# Patient Record
Sex: MALE | Race: WHITE | Employment: OTHER | URBAN - METROPOLITAN AREA
[De-identification: names, ages, dates, MRNs, and addresses within clinical notes are randomized per-mention and may not be internally consistent; named-entity substitution may affect disease eponyms.]

---

## 2020-09-25 ENCOUNTER — OFFICE VISIT (OUTPATIENT)
Dept: FAMILY MEDICINE CLINIC | Age: 55
End: 2020-09-25
Payer: COMMERCIAL

## 2020-09-25 VITALS
DIASTOLIC BLOOD PRESSURE: 72 MMHG | HEART RATE: 64 BPM | BODY MASS INDEX: 24.38 KG/M2 | RESPIRATION RATE: 18 BRPM | SYSTOLIC BLOOD PRESSURE: 118 MMHG | WEIGHT: 190 LBS | OXYGEN SATURATION: 99 % | HEIGHT: 74 IN

## 2020-09-25 DIAGNOSIS — H93.13 TINNITUS OF BOTH EARS: Primary | ICD-10-CM

## 2020-09-25 PROCEDURE — 99212 OFFICE O/P EST SF 10 MIN: CPT | Performed by: NURSE PRACTITIONER

## 2020-09-25 RX ORDER — CHLORHEXIDINE GLUCONATE 1.2 MG/ML
15 RINSE ORAL EVERY 12 HOURS
COMMUNITY
End: 2020-12-17

## 2020-09-25 RX ORDER — VALACYCLOVIR HYDROCHLORIDE 500 MG/1
TABLET, FILM COATED ORAL 2 TIMES DAILY
COMMUNITY
End: 2021-02-22 | Stop reason: SDUPTHER

## 2020-10-01 PROBLEM — H93.13 TINNITUS OF BOTH EARS: Status: ACTIVE | Noted: 2020-10-01

## 2020-10-02 NOTE — PATIENT INSTRUCTIONS
We will fax the referral to the ENT provider and can make an appt at your convenience. Consider signing up for MyChart for easier communication w/ office.

## 2020-10-02 NOTE — PROGRESS NOTES
Chief Complaint   Patient presents with   [facility] in Ear     would like to get a referral to another specialist     Visit Vitals  /72 (BP 1 Location: Right arm, BP Patient Position: Sitting)   Pulse 64   Resp 18   Ht 6' 2\" (1.88 m)   Wt 190 lb (86.2 kg)   SpO2 99%   BMI 24.39 kg/m²     Yocasta Arrington is a 60 y.o. male. HPI   Pt usually seen by Dr. Nadir Syed. Presented today w/ request for a referral to another ENT provider for a second opinion regarding dx of tinnitus. He has a provider in mind. Was referred to audiology for an audiogram by first ENT and apparently was ok. He also had a lot of cerumen in both ears that was removed. Pt thinks there might be more options for him, thus request for second opinion. Pt is feeling well today. He tries to eat well and gets a lot of exercise- V/S good and his weight is in normal range. No other concerns. Has dx of tinnitus. Patient Active Problem List   Diagnosis Code    Tinnitus of both ears H93.13     History reviewed. No pertinent past medical history. Current Outpatient Medications   Medication Instructions    chlorhexidine (PERIDEX) 0.12 % solution 15 mL, Swish and Spit, EVERY 12 HOURS    valACYclovir (Valtrex) 500 mg tablet Oral, 2 TIMES DAILY     No Known Allergies  Social History     Tobacco Use    Smoking status: Never Smoker    Smokeless tobacco: Never Used   Substance Use Topics    Alcohol use: Never     Frequency: Never    Drug use: Never     Family History   Problem Relation Age of Onset    Hypertension Mother     Heart Disease Father      Review of Systems   Constitutional: Negative for chills and fever. HENT: Positive for tinnitus. Negative for ear discharge, ear pain, hearing loss, sinus pain and sore throat. Eyes: Negative for blurred vision. Respiratory: Negative for cough, shortness of breath and wheezing. Cardiovascular: Negative for chest pain and palpitations.    Gastrointestinal: Negative for nausea and vomiting. Musculoskeletal: Negative for joint pain and myalgias. Skin: Negative for rash. Neurological: Negative for dizziness and headaches. Psychiatric/Behavioral: Negative for depression. The patient is not nervous/anxious. Objective  Physical Exam  Constitutional:       Appearance: Normal appearance. HENT:      Head: Normocephalic. Right Ear: Tympanic membrane, ear canal and external ear normal.      Left Ear: Tympanic membrane, ear canal and external ear normal.      Nose: Nose normal.   Eyes:      Conjunctiva/sclera: Conjunctivae normal.   Neck:      Musculoskeletal: Neck supple. Cardiovascular:      Pulses: Normal pulses. Heart sounds: Normal heart sounds. Pulmonary:      Effort: Pulmonary effort is normal.      Breath sounds: Normal breath sounds. Musculoskeletal: Normal range of motion. Right lower leg: No edema. Left lower leg: No edema. Skin:     General: Skin is warm and dry. Coloration: Skin is not jaundiced. Neurological:      Mental Status: He is alert and oriented to person, place, and time. Psychiatric:         Mood and Affect: Mood normal.         Behavior: Behavior normal.         Thought Content: Thought content normal.        Assessment & Plan    ICD-10-CM ICD-9-CM    1.  Tinnitus of both ears  H93.13 388.30 REFERRAL TO ENT-OTOLARYNGOLOGY     Christina Caballero NP

## 2020-11-12 ENCOUNTER — TELEPHONE (OUTPATIENT)
Dept: FAMILY MEDICINE CLINIC | Age: 55
End: 2020-11-12

## 2020-11-12 NOTE — TELEPHONE ENCOUNTER
Sommer left a voicemail that  he had cut himself with sheet metal and wanted to know when the last TDAP injection was given. Returned patient's call. X 2 and both went to voicemail. Voicemail left with the date of 04/19/2015. Suggested new dose at pharmacy or to call the office since it was 5 years ago. I told him that they are good for 5-10 years but should be given sooner if they receive a cut.

## 2020-12-17 ENCOUNTER — OFFICE VISIT (OUTPATIENT)
Dept: FAMILY MEDICINE CLINIC | Age: 55
End: 2020-12-17
Payer: COMMERCIAL

## 2020-12-17 VITALS
WEIGHT: 191 LBS | OXYGEN SATURATION: 98 % | SYSTOLIC BLOOD PRESSURE: 110 MMHG | HEART RATE: 63 BPM | DIASTOLIC BLOOD PRESSURE: 74 MMHG | TEMPERATURE: 98.4 F | RESPIRATION RATE: 16 BRPM | HEIGHT: 74 IN | BODY MASS INDEX: 24.51 KG/M2

## 2020-12-17 DIAGNOSIS — I86.1 VARICOCELE: ICD-10-CM

## 2020-12-17 DIAGNOSIS — R19.5 LOOSE STOOLS: ICD-10-CM

## 2020-12-17 DIAGNOSIS — R30.0 DYSURIA: ICD-10-CM

## 2020-12-17 PROCEDURE — 99214 OFFICE O/P EST MOD 30 MIN: CPT | Performed by: FAMILY MEDICINE

## 2020-12-17 NOTE — ASSESSMENT & PLAN NOTE
Intermittent x 1 week. UA normal at urgent care by report. No discharge. Not sexually active, resolving. Will monitor.

## 2020-12-17 NOTE — PROGRESS NOTES
Patient: Heaven Gramajo               Sex: male             MRN: [id number]     YOB: 1965      Age:  60 y.o. Subjective:     Heaven Gramajo is a 60 y.o. male presents c/o scrotal discomfort and loose stools. Also notes dysuria recently that is resolving. For 1-2 weeks notes bilateral scrotal discomfort. Described as a heavy feeling with aching at times. No constant. Worse after being on feet for most of the day. Of note he's been working out more to include heavy lifting. No masses appreciated. No h/o scrotal/testicalar issues. No discharge. No fevers. No treatment to date. He also notes some loose stools, intermittent. No blood. No abdominal pain. Dysuria seen at urgent care, improving. History reviewed. No pertinent past medical history. Past Surgical History:   Procedure Laterality Date    HX COLONOSCOPY  2018 2018 repeat in 5 years     HX ORTHOPAEDIC      left ankle surgery        Family History   Problem Relation Age of Onset    Hypertension Mother     Heart Disease Father     Hearing Impairment Father     Kidney Disease Father        Social History     Socioeconomic History    Marital status:      Spouse name: Not on file    Number of children: Not on file    Years of education: Not on file    Highest education level: Not on file   Tobacco Use    Smoking status: Never Smoker    Smokeless tobacco: Former User     Types: Snuff   Substance and Sexual Activity    Alcohol use: Not Currently     Frequency: Never    Drug use: Never       Prior to Admission medications    Medication Sig Start Date End Date Taking? Authorizing Provider   valACYclovir (Valtrex) 500 mg tablet Take  by mouth two (2) times a day. Provider, Historical       No Known Allergies      Review of Systems  Review of Systems   Constitutional: Negative for chills and fever. HENT: Negative. Eyes: Negative for blurred vision.    Respiratory: Negative for cough and shortness of breath. Cardiovascular: Negative for chest pain and palpitations. Gastrointestinal: Negative for abdominal pain, blood in stool, constipation, melena, nausea and vomiting. Genitourinary: Positive for dysuria. Negative for flank pain, frequency, hematuria and urgency. Musculoskeletal: Negative for joint pain. Skin: Negative for rash. Neurological: Negative for sensory change and focal weakness. Objective:     Vitals:    12/17/20 0840 12/17/20 0845   BP: 132/76 110/74   Pulse: 63    Resp: 16    Temp: 98.4 °F (36.9 °C)    TempSrc: Oral    SpO2: 98%    Weight: 191 lb (86.6 kg)    Height: 6' 2\" (1.88 m)          Physical Exam:  Physical Exam  Vitals signs reviewed. Constitutional:       Appearance: Normal appearance. Neurological:      Mental Status: He is alert. Psychiatric:         Mood and Affect: Mood normal.          Assessment/Plan     Diagnoses and all orders for this visit:    1. Varicocele  Assessment & Plan:  Scrotal discomfort with \"heavy feeling\". Most c/w varicocele. Advised nsaids, supportive underwear and modification of heavy lifting. Check scrotal US as well. Orders:  -     US SCROTUM/TESTICLES; Future    2. Loose stools  Assessment & Plan:  No melena or hematochezia. No ab pain. Intermittent loose stools. Start with trial of daily fiber. 3. Dysuria  Assessment & Plan:  Intermittent x 1 week. UA normal at urgent care by report. No discharge. Not sexually active, resolving. Will monitor.              Genesis Lockett MD

## 2020-12-17 NOTE — ASSESSMENT & PLAN NOTE
Scrotal discomfort with \"heavy feeling\". Most c/w varicocele. Advised nsaids, supportive underwear and modification of heavy lifting. Check scrotal US as well.

## 2021-02-22 ENCOUNTER — OFFICE VISIT (OUTPATIENT)
Dept: FAMILY MEDICINE CLINIC | Age: 56
End: 2021-02-22
Payer: COMMERCIAL

## 2021-02-22 VITALS
HEIGHT: 74 IN | SYSTOLIC BLOOD PRESSURE: 130 MMHG | BODY MASS INDEX: 25.72 KG/M2 | OXYGEN SATURATION: 100 % | WEIGHT: 200.4 LBS | DIASTOLIC BLOOD PRESSURE: 82 MMHG | HEART RATE: 59 BPM | TEMPERATURE: 97.7 F

## 2021-02-22 DIAGNOSIS — Z00.00 ENCOUNTER FOR ROUTINE ADULT HEALTH EXAMINATION WITHOUT ABNORMAL FINDINGS: Primary | ICD-10-CM

## 2021-02-22 DIAGNOSIS — Z12.5 SCREENING FOR PROSTATE CANCER: ICD-10-CM

## 2021-02-22 DIAGNOSIS — H93.13 TINNITUS OF BOTH EARS: ICD-10-CM

## 2021-02-22 DIAGNOSIS — B00.1 FEVER BLISTER: ICD-10-CM

## 2021-02-22 DIAGNOSIS — R53.83 FATIGUE, UNSPECIFIED TYPE: ICD-10-CM

## 2021-02-22 PROCEDURE — 99396 PREV VISIT EST AGE 40-64: CPT | Performed by: FAMILY MEDICINE

## 2021-02-22 RX ORDER — VALACYCLOVIR HYDROCHLORIDE 500 MG/1
500 TABLET, FILM COATED ORAL DAILY
Qty: 90 TAB | Refills: 3 | Status: SHIPPED | OUTPATIENT
Start: 2021-02-22 | End: 2022-02-22 | Stop reason: SDUPTHER

## 2021-02-22 NOTE — PROGRESS NOTES
IIDENTIFYING INFORMATION:  Vanessa Goodson. [place] , 60 y.o., male  [address] [address]     CHIEF COMPLAINT:   Chief Complaint   Patient presents with    Physical     HISTORY OF PRESENT ILLNESS:  John Harman is a 60 y.o. male with the below listed past medical history and comes in to the office today for a yearly follow-up. Is been a while since he has had some blood work. He also had an order to get an ultrasound of his testicles considering varicocele as an etiology to pain. However, he change his undershorts from boxers to brace and feels much better. He does feel fatigued but he thinks is not due to anything besides the fact he is not sleeping well. He is a 11year-old child living in his house and she wakes up at least once every night. He is a light sleeper. She is getting little better and he is able to rest more. He has no daytime somnolence per se unless he sitting down watching TV he will notice it. He can read and/or write to drive function daily etc. without falling asleep. He has no snoring or apneic type spells according to his wife although she is not present he denies being told that. Also, he does need a refill of his valacyclovir. He has been getting a 3-year colonoscopy instead of 5. He had one in 2018 showing nothing but needing to get some repeat at 5 years. His first-degree relative, father, did have some polyps but no one has cancer per se. ALLERGIES:   No Known Allergies    MEDICATIONS:   Current Outpatient Medications on File Prior to Visit   Medication Sig Dispense Refill    [DISCONTINUED] valACYclovir (Valtrex) 500 mg tablet Take  by mouth two (2) times a day. No current facility-administered medications on file prior to visit. PAST MEDICAL HISTORY:  History reviewed. No pertinent past medical history.     SOCIAL HISTORY:   Social History     Tobacco Use    Smoking status: Never Smoker    Smokeless tobacco: Former User     Types: Snuff Substance Use Topics    Alcohol use: Not Currently     Frequency: Never    Drug use: Never       SURGICAL HISTORY:  Past Surgical History:   Procedure Laterality Date    HX COLONOSCOPY  2018    2018 repeat in 5 years family history of polyps    HX ORTHOPAEDIC      left ankle surgery         FAMILY HISTORY:  Family History   Problem Relation Age of Onset    Hypertension Mother     Heart Disease Father     Hearing Impairment Father     Kidney Disease Father          REVIEW OF SYSTEMS:  I personally collected this information from all available source present (patient/others in room and records available) -[email or web address]    Review of Systems   Constitutional: Positive for fatigue. Negative for activity change, appetite change, chills, diaphoresis, fever and unexpected weight change. HENT: Negative. Negative for congestion, ear discharge, ear pain, hearing loss, rhinorrhea, sinus pressure, sneezing, sore throat, tinnitus, trouble swallowing and voice change. Eyes: Negative. Negative for photophobia, pain, discharge and visual disturbance. Respiratory: Negative. Negative for cough, chest tightness, shortness of breath and wheezing. Cardiovascular: Negative. Negative for chest pain, palpitations and leg swelling. Gastrointestinal: Negative. Negative for abdominal distention, abdominal pain, blood in stool, constipation, diarrhea, nausea and vomiting. Endocrine: Negative. Negative for cold intolerance, heat intolerance, polydipsia and polyphagia. Genitourinary: Negative. Negative for difficulty urinating, dysuria, frequency, hematuria and urgency. Musculoskeletal: Negative. Negative for arthralgias, back pain, gait problem, joint swelling, myalgias and neck pain. Skin: Negative. Negative for color change and rash. Allergic/Immunologic: Negative. Neurological: Negative. Negative for dizziness, tremors, syncope, speech difficulty, weakness, light-headedness, numbness and headaches. Hematological: Negative. Negative for adenopathy. Does not bruise/bleed easily. Psychiatric/Behavioral: Positive for sleep disturbance. Negative for agitation, behavioral problems, confusion, decreased concentration, dysphoric mood, hallucinations and suicidal ideas. The patient is not nervous/anxious. PHYSICAL EXAMINATION:  Vital Signs:    Visit Vitals  /82 (BP 1 Location: Right upper arm, BP Patient Position: Sitting)   Pulse (!) 59   Temp 97.7 °F (36.5 °C) (Temporal)   Ht 6' 2\" (1.88 m)   Wt 200 lb 6.4 oz (90.9 kg)   SpO2 100%   BMI 25.73 kg/m²         Wt Readings from Last 3 Encounters:   02/22/21 200 lb 6.4 oz (90.9 kg)   12/17/20 191 lb (86.6 kg)   09/25/20 190 lb (86.2 kg)     BP Readings from Last 3 Encounters:   02/22/21 130/82   12/17/20 110/74   09/25/20 118/72         Physical Exam  Nursing note reviewed. Constitutional:       General: He is not in acute distress. Appearance: Normal appearance. He is not ill-appearing or toxic-appearing. HENT:      Right Ear: Tympanic membrane, ear canal and external ear normal.      Left Ear: Tympanic membrane, ear canal and external ear normal.      Nose: No congestion or rhinorrhea. Mouth/Throat:      Pharynx: No oropharyngeal exudate or posterior oropharyngeal erythema. Eyes:      General: No scleral icterus. Extraocular Movements: Extraocular movements intact. Conjunctiva/sclera: Conjunctivae normal.      Pupils: Pupils are equal, round, and reactive to light. Neck:      Musculoskeletal: No neck rigidity or muscular tenderness. Vascular: No carotid bruit. Cardiovascular:      Rate and Rhythm: Normal rate and regular rhythm. Heart sounds: No murmur. No friction rub. No gallop. Pulmonary:      Effort: Pulmonary effort is normal.      Breath sounds: Normal breath sounds. No wheezing, rhonchi or rales. Abdominal:      General: Bowel sounds are normal. There is no distension.       Palpations: Abdomen is soft. There is no mass. Tenderness: There is no abdominal tenderness. Musculoskeletal:         General: No swelling, tenderness or deformity. Right lower leg: No edema. Left lower leg: No edema. Lymphadenopathy:      Cervical: No cervical adenopathy. Skin:     Capillary Refill: Capillary refill takes less than 2 seconds. Coloration: Skin is not jaundiced. Findings: No erythema or rash. Neurological:      General: No focal deficit present. Mental Status: He is alert and oriented to person, place, and time. Mental status is at baseline. Cranial Nerves: No cranial nerve deficit. Sensory: No sensory deficit. Coordination: Coordination normal.      Gait: Gait normal.   Psychiatric:         Mood and Affect: Mood normal.         Behavior: Behavior normal.         Thought Content: Thought content normal.         Judgment: Judgment normal.         ASSESSMENT/PLAN:    Discussion (regarding today's visit with Clare Andrews);  1. The patient and I discussed all age and gender appropriate screening exams. 2. Risk of non compliance discussed including missing early, treatable and possibly curable serious health issues. 3. Labs, imaging and vaccinations as well as any other pertinent testing was ordered if appropriate. Also, if his fatigue continues we may consider sleep study though it does seem like his sleep disturbance is related to the 11year-old child in his house and he is a light sleeper. Diet take this opportunity to check a TSH and a testosterone level. Or symptoms otherwise besides the fatigue. I think the history of prostatitis and varicoceles was addressed. He felt much better from changing his under shorts from boxers to briefs which gives him little support and he declined to get the ultrasound. However, should his pain return he may proceed and get rescheduled but he seems to be doing fairly well overall.   He will ask his gastroenterologist about getting a colonoscopy in 3 versus 5 years. He did had one in 2018 and they recommended a 5-year repeat. There is no first-degree relative with colon cancer but father did have some polyps. He is concerned and thinks he should have it at 3 years which is this year he will keep me apprised of that situation. ICD-10-CM ICD-9-CM    1. Encounter for routine adult health examination without abnormal findings  Z00.00 V70.0 CBC WITH AUTOMATED DIFF      LIPID PANEL      URINALYSIS W/ RFLX MICROSCOPIC   2. Fatigue, unspecified type  R53.83 780.79 TSH 3RD GENERATION      TESTOSTERONE, FREE & TOTAL   3. Screening for prostate cancer  Z12.5 V76.44 PSA SCREENING (SCREENING)      METABOLIC PANEL, COMPREHENSIVE   4. Tinnitus of both ears  H93.13 388.30    5. Fever blister  B00.1 054.9 valACYclovir (Valtrex) 500 mg tablet     Each diagnosis listed for today's visit including medications, treatment, testing such as labs, imagine, referrals and when to call regarding results and appointments. Reminded patient to keep any and all appointments with specialists, labs, imaging. Reminded patient to make sure we get copies of any specialists care, labs and imaging. Reminded patient to call of come by the office if there are any concerns, questions , comments or problems. The patient verbalized understanding of the care plan and all questions were answered to the patient's satisfaction prior to leaving the office. The patient was told that failure to comply with recommended testing could result in abnormal health consequences. The patient was instructed to have yearly routine health maintenance including but not limited to age appropriate vaccines, testing, screening exams. ALL questions were answered to his satisfaction before leaving the office. The patient actively participated in medical decision making. FOLLOW UP:   Patient knows to keep any and all future visits scheduled unless told otherwise. Patient knows to call, come back if any concerns, questions, comments or problems arise. Follow-up and Dispositions    · Return in about 1 year (around 2/22/2022) for follow up yearly. This visit may have been completed , in part, with voice recognition software as well as typing and may have syntax errors despite editing.       Mike Lundberg, DO

## 2021-02-22 NOTE — PATIENT INSTRUCTIONS
     
Routine Health maintenance: You need to get a yearly follow up/physical exam to review, discuss age and gender appropriate exams, labs, vaccines and screening tests. This includes cardiovascular health risk, cancer screens and other camila related topics. Medications-Take all medications as directed. Please do not stop unless you talk to your doctor or health care provider first. Report any problems immediately. Referrals: if you have been given a referral, please call the office if you do not hear from provider in one week. You may make the appointment yourself. Please keep all appointments with specialists and ask them to send their notes, thoughts, recommendations to us , as your PCP. Imaging/Labs:  Be sure to get these images in a timely manner. IF your test must be scheduled, let us know if you need help getting this done and if you do not hear from that provider in a week , call us or them. BE SURE to call the office if you do not hear regarding the results in one week after the test is performed Image or lab). It is our intention to inform you of the results ALWAYS, even if normal you should get a notification (Call, portal message). PLEASE ann if you do not get the results. PLEASE follow all recommendations and call/come in /ask questions if you do not understand of if problems develop after or in between visits. Failure to comply with recommended health care advise could result in serious health consequences. Thank you for choosing our practice and please let us know how we can help you feel better and stay well!

## 2021-02-24 LAB
ALBUMIN SERPL-MCNC: 4.5 G/DL (ref 3.8–4.9)
ALBUMIN/GLOB SERPL: 1.8 {RATIO} (ref 1.2–2.2)
ALP SERPL-CCNC: 54 IU/L (ref 39–117)
ALT SERPL-CCNC: 16 IU/L (ref 0–44)
APPEARANCE UR: CLEAR
AST SERPL-CCNC: 17 IU/L (ref 0–40)
BASOPHILS # BLD AUTO: 0 X10E3/UL (ref 0–0.2)
BASOPHILS NFR BLD AUTO: 1 %
BILIRUB SERPL-MCNC: 0.8 MG/DL (ref 0–1.2)
BILIRUB UR QL STRIP: NEGATIVE
BUN SERPL-MCNC: 15 MG/DL (ref 6–24)
BUN/CREAT SERPL: 14 (ref 9–20)
CALCIUM SERPL-MCNC: 9.5 MG/DL (ref 8.7–10.2)
CHLORIDE SERPL-SCNC: 101 MMOL/L (ref 96–106)
CHOLEST SERPL-MCNC: 160 MG/DL (ref 100–199)
CO2 SERPL-SCNC: 25 MMOL/L (ref 20–29)
COLOR UR: YELLOW
CREAT SERPL-MCNC: 1.1 MG/DL (ref 0.76–1.27)
EOSINOPHIL # BLD AUTO: 0.2 X10E3/UL (ref 0–0.4)
EOSINOPHIL NFR BLD AUTO: 3 %
ERYTHROCYTE [DISTWIDTH] IN BLOOD BY AUTOMATED COUNT: 12.7 % (ref 11.6–15.4)
GLOBULIN SER CALC-MCNC: 2.5 G/DL (ref 1.5–4.5)
GLUCOSE SERPL-MCNC: 88 MG/DL (ref 65–99)
GLUCOSE UR QL: NEGATIVE
HCT VFR BLD AUTO: 47.5 % (ref 37.5–51)
HDLC SERPL-MCNC: 64 MG/DL
HGB BLD-MCNC: 16.2 G/DL (ref 13–17.7)
HGB UR QL STRIP: NEGATIVE
IMM GRANULOCYTES # BLD AUTO: 0 X10E3/UL (ref 0–0.1)
IMM GRANULOCYTES NFR BLD AUTO: 0 %
KETONES UR QL STRIP: NEGATIVE
LDLC SERPL CALC-MCNC: 87 MG/DL (ref 0–99)
LEUKOCYTE ESTERASE UR QL STRIP: NEGATIVE
LYMPHOCYTES # BLD AUTO: 1.4 X10E3/UL (ref 0.7–3.1)
LYMPHOCYTES NFR BLD AUTO: 24 %
MCH RBC QN AUTO: 30.5 PG (ref 26.6–33)
MCHC RBC AUTO-ENTMCNC: 34.1 G/DL (ref 31.5–35.7)
MCV RBC AUTO: 89 FL (ref 79–97)
MICRO URNS: ABNORMAL
MONOCYTES # BLD AUTO: 0.6 X10E3/UL (ref 0.1–0.9)
MONOCYTES NFR BLD AUTO: 10 %
NEUTROPHILS # BLD AUTO: 3.6 X10E3/UL (ref 1.4–7)
NEUTROPHILS NFR BLD AUTO: 62 %
NITRITE UR QL STRIP: NEGATIVE
PH UR STRIP: 6 [PH] (ref 5–7.5)
PLATELET # BLD AUTO: 287 X10E3/UL (ref 150–450)
POTASSIUM SERPL-SCNC: 4.6 MMOL/L (ref 3.5–5.2)
PROT SERPL-MCNC: 7 G/DL (ref 6–8.5)
PROT UR QL STRIP: NEGATIVE
PSA SERPL-MCNC: 1.1 NG/ML (ref 0–4)
RBC # BLD AUTO: 5.32 X10E6/UL (ref 4.14–5.8)
SODIUM SERPL-SCNC: 139 MMOL/L (ref 134–144)
SP GR UR: <=1.005 (ref 1–1.03)
TESTOST FREE SERPL-MCNC: 17.4 PG/ML (ref 7.2–24)
TESTOST SERPL-MCNC: 617 NG/DL (ref 264–916)
TRIGL SERPL-MCNC: 40 MG/DL (ref 0–149)
TSH SERPL DL<=0.005 MIU/L-ACNC: 1.99 UIU/ML (ref 0.45–4.5)
UROBILINOGEN UR STRIP-MCNC: 0.2 MG/DL (ref 0.2–1)
VLDLC SERPL CALC-MCNC: 9 MG/DL (ref 5–40)
WBC # BLD AUTO: 5.7 X10E3/UL (ref 3.4–10.8)

## 2021-02-26 NOTE — PROGRESS NOTES
Is normal without anemia or infection. Cholesterol is great. Urinalysis is okay is aware of probably dehydration from fasting.   Prostate is normal.  Thyroid is normal.  Testosterone is normal.  Liver, kidney, sugar normal.

## 2021-06-15 ENCOUNTER — TELEPHONE (OUTPATIENT)
Dept: FAMILY MEDICINE CLINIC | Age: 56
End: 2021-06-15

## 2021-06-15 ENCOUNTER — OFFICE VISIT (OUTPATIENT)
Dept: FAMILY MEDICINE CLINIC | Age: 56
End: 2021-06-15
Payer: COMMERCIAL

## 2021-06-15 VITALS
TEMPERATURE: 97.5 F | DIASTOLIC BLOOD PRESSURE: 72 MMHG | WEIGHT: 195 LBS | SYSTOLIC BLOOD PRESSURE: 117 MMHG | HEART RATE: 64 BPM | BODY MASS INDEX: 25.03 KG/M2 | HEIGHT: 74 IN | OXYGEN SATURATION: 99 %

## 2021-06-15 DIAGNOSIS — K64.0 GRADE I HEMORRHOIDS: Primary | ICD-10-CM

## 2021-06-15 DIAGNOSIS — L03.115 CELLULITIS OF RIGHT LOWER EXTREMITY: ICD-10-CM
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
## 2021-06-15 PROCEDURE — 99214 OFFICE O/P EST MOD 30 MIN: CPT | Performed by: FAMILY MEDICINE

## 2021-06-15 RX ORDER — MUPIROCIN 20 MG/G
OINTMENT TOPICAL DAILY
Qty: 22 G | Refills: 0 | Status: SHIPPED | OUTPATIENT
Start: 2021-06-15 | End: 2022-04-19 | Stop reason: ALTCHOICE

## 2021-06-15 RX ORDER — TRIAMCINOLONE ACETONIDE 1 MG/G
CREAM TOPICAL 2 TIMES DAILY
Qty: 15 G | Refills: 0 | Status: SHIPPED | OUTPATIENT
Start: 2021-06-15 | End: 2021-06-15

## 2021-06-15 RX ORDER — DOXYCYCLINE 100 MG/1
100 CAPSULE ORAL 2 TIMES DAILY
Qty: 20 CAPSULE | Refills: 0 | Status: SHIPPED | OUTPATIENT
Start: 2021-06-15 | End: 2021-06-15

## 2021-06-15 RX ORDER — DOXYCYCLINE 100 MG/1
100 CAPSULE ORAL 2 TIMES DAILY
Qty: 20 CAPSULE | Refills: 0 | Status: SHIPPED | OUTPATIENT
Start: 2021-06-15 | End: 2021-06-25

## 2021-06-15 RX ORDER — MUPIROCIN 20 MG/G
OINTMENT TOPICAL DAILY
Qty: 22 G | Refills: 0 | Status: SHIPPED | OUTPATIENT
Start: 2021-06-15 | End: 2021-06-15

## 2021-06-15 RX ORDER — TRIAMCINOLONE ACETONIDE 1 MG/G
CREAM TOPICAL 2 TIMES DAILY
Qty: 15 G | Refills: 0 | Status: SHIPPED | OUTPATIENT
Start: 2021-06-15 | End: 2022-04-19 | Stop reason: ALTCHOICE

## 2021-06-15 NOTE — PROGRESS NOTES
1. Have you been to the ER, urgent care clinic since your last visit? Hospitalized since your last visit? See note below    2. Have you seen or consulted any other health care providers outside of the [address] [place] [address] since your last visit? Include any pap smears or colon screening. No    Chief Complaint   Patient presents with    Tick Removal     c/o tick bite right inner thigh. Was giving rx for Doxcycline for 2 days.  Hemorrhoids     Was seen at [facility] yesterday. Was told to try OTC Preperation H suppositories.       Visit Vitals  /72 (BP 1 Location: Right upper arm, BP Patient Position: Sitting)   Pulse 64   Temp 97.5 °F (36.4 °C) (Temporal)   Ht 6' 2\" (1.88 m)   Wt 195 lb (88.5 kg)   SpO2 99%   BMI 25.04 kg/m²

## 2021-06-15 NOTE — PROGRESS NOTES
IDENTIFYING INFORMATION:      Kenji ShuklaJess Villatoro , 60 y.o., male  [address]     Medical Record Number: [id number]          CHIEF COMPLAINT:     Chief Complaint   Patient presents with    Tick Removal     c/o tick bite right inner thigh. Was giving rx for Doxcycline for 2 days.  Hemorrhoids     Was seen at [facility] yesterday. Was told to try OTC Preperation H suppositories. HISTORY OF PRESENT ILLNESS:    Dalton Mabry is a 60 y.o. male  has no past medical history on file. .  he comes in for two complaints. Has  Hemorrhoids and was at [facility] day prior for tick bite and they said it sounded like, has been lifting a bit and says that he has some preparation H suppository, didn't use until times once. Bowel movements hurt. NO hematochezia or melena. Also had a tick bite. On or about 6-11-21. Says it looked to be whole tick, still has a small area of darkness. Was power washing house. NO fevers, chills, sweats nauea , vomiting, diarrhea. PAST MEDICAL HISTORY:     History reviewed. No pertinent past medical history. MEDICATIONS:     Current Outpatient Medications on File Prior to Visit   Medication Sig Dispense Refill    valACYclovir (Valtrex) 500 mg tablet Take 1 Tab by mouth daily. 90 Tab 3     No current facility-administered medications on file prior to visit.        ALLERGIES:    No Known Allergies      SOCIAL HISTORY:     Social History     Tobacco Use    Smoking status: Never Smoker    Smokeless tobacco: Former User     Types: Snuff   Vaping Use    Vaping Use: Never used   Substance Use Topics    Alcohol use: Not Currently    Drug use: Never       SURGICAL HISTORY:    Past Surgical History:   Procedure Laterality Date    HX COLONOSCOPY  2018    2018 repeat in 5 years family history of polyps    HX ORTHOPAEDIC      left ankle surgery         FAMILY HISTORY:    Family History   Problem Relation Age of Onset    Hypertension Mother    [place] Heart Disease Father     Hearing Impairment Father     Kidney Disease Father          REVIEW OF SYSTEMS:    I personally collected this information from all available source present (patient/others in room and records available) -[email or web address]    Review of Systems   Constitutional: Negative for chills, diaphoresis, fever and weight loss. HENT: Negative for congestion, ear pain, hearing loss, nosebleeds, sinus pain, sore throat and tinnitus. Eyes: Negative for blurred vision, double vision, photophobia and pain. Respiratory: Negative for cough, sputum production and shortness of breath. Cardiovascular: Negative for chest pain, palpitations, orthopnea, claudication, leg swelling and PND. Gastrointestinal: Negative for abdominal pain, blood in stool, constipation, diarrhea, heartburn, melena, nausea and vomiting. Hemorrhoids and some rectal pain   Genitourinary: Negative for dysuria, frequency and urgency. Musculoskeletal: Negative for back pain, falls, joint pain, myalgias and neck pain. Skin: Positive for rash (no rash per se, some redness and swelling right leg at tick bite). Negative for itching. Neurological: Negative for dizziness, tingling, tremors, sensory change, focal weakness and headaches. Psychiatric/Behavioral: Negative for depression and suicidal ideas. The patient is not nervous/anxious and does not have insomnia. PHYSICAL EXAMINATION:    Vital Signs:    Visit Vitals  /72 (BP 1 Location: Right upper arm, BP Patient Position: Sitting)   Pulse 64   Temp 97.5 °F (36.4 °C) (Temporal)   Ht 6' 2\" (1.88 m)   Wt 195 lb (88.5 kg)   SpO2 99%   BMI 25.04 kg/m²         Wt Readings from Last 3 Encounters:   06/15/21 195 lb (88.5 kg)   02/22/21 200 lb 6.4 oz (90.9 kg)   12/17/20 191 lb (86.6 kg)     BP Readings from Last 3 Encounters:   06/15/21 117/72   02/22/21 130/82   12/17/20 110/74         Physical Exam  Vitals reviewed.    Constitutional:       General: He is not in acute distress. Appearance: He is not ill-appearing. Eyes:      General: No scleral icterus. Extraocular Movements: Extraocular movements intact. Conjunctiva/sclera: Conjunctivae normal.      Pupils: Pupils are equal, round, and reactive to light. Neck:      Vascular: No carotid bruit. Cardiovascular:      Rate and Rhythm: Normal rate and regular rhythm. Pulses: Normal pulses. Heart sounds: No murmur heard. No friction rub. No gallop. Pulmonary:      Effort: Pulmonary effort is normal.      Breath sounds: Normal breath sounds. No wheezing, rhonchi or rales. Abdominal:      General: There is no distension. Palpations: Abdomen is soft. There is no mass. Tenderness: There is no abdominal tenderness. There is no guarding. Genitourinary:     Penis: Normal.       Testes: Normal.      Prostate: Normal.      Rectum: Normal. Guaiac result negative. Comments: Large internal hemorrhoid about the 4 o'clock position  Musculoskeletal:         General: No deformity. Cervical back: No rigidity. No muscular tenderness. Right lower leg: No edema. Left lower leg: No edema. Lymphadenopathy:      Cervical: No cervical adenopathy. Skin:     General: Skin is warm and dry. Capillary Refill: Capillary refill takes less than 2 seconds. Coloration: Skin is not jaundiced. Findings: Lesion (There is about a 3 mm black area with 5 mm surrounding erythema right inner thigh.) present. No bruising, erythema or rash. Neurological:      General: No focal deficit present. Mental Status: He is alert and oriented to person, place, and time. Mental status is at baseline. Psychiatric:         Mood and Affect: Mood normal.         Behavior: Behavior normal.         Thought Content: Thought content normal.         Judgment: Judgment normal.           ASSESSMENT/PLAN:      Discussion (regarding today's visit with Poonam Chaudhari);   · Definitely has some, insect bite.  · Looks like it has slight eschar, I do not appreciate retained insect part  · We will go ahead and put him on doxycycline empirically  · Again mupirocin ointment for the lesion on his leg  · Will give him triamcinolone cream for his hemorrhoid use twice daily, apply with toilet tissue. · Stool softener, keep bowels soft  · If this does not improve within 7 to 10 days let us know. ICD-10-CM ICD-9-CM    1. Grade I hemorrhoids  K64.0 455.0 triamcinolone acetonide (KENALOG) 0.1 % topical cream      DISCONTINUED: triamcinolone acetonide (KENALOG) 0.1 % topical cream   2. Cellulitis of right lower extremity  L03.115 682.6 doxycycline (VIBRAMYCIN) 100 mg capsule      mupirocin (BACTROBAN) 2 % ointment      DISCONTINUED: mupirocin (BACTROBAN) 2 % ointment      DISCONTINUED: doxycycline (VIBRAMYCIN) 100 mg capsule   3. Tick bite, initial encounter  W57. XXXA 919.4 doxycycline (VIBRAMYCIN) 100 mg capsule     E906.4 DISCONTINUED: doxycycline (VIBRAMYCIN) 100 mg capsule       WE reviewed each diagnosis listed for today's visit including medications, treatment, testing such as labs, imagine, referrals and when to call regarding results and appointments. Reminded patient to keep any and all appointments with specialists, labs, imaging. Reminded patient to make sure we get copies of any specialists care, labs and imaging. Reminded patient to call of come by the office if there are any concerns, questions , comments or problems. The patient verbalized understanding of the care plan and all questions were answered to the patient's satisfaction prior to leaving the office. The patient was told that failure to comply with recommended testing could result in abnormal health consequences. The patient was instructed to have yearly routine health maintenance including but not limited to age appropriate vaccines, testing, screening exams. ALL questions were answered to his satisfaction before leaving the office. The patient actively participated in medical decision making. FOLLOW UP:     Patient knows to keep any and all future visits scheduled unless told otherwise. Patient knows to call, come back if any concerns, questions, comments or problems arise. Follow-up and Dispositions    · Return if symptoms worsen or fail to improve. Queen Janine DO    This visit was reviewed and signed electronically. It was been completed with voice recognition software and hand typing. It may have syntax and spelling errors despite editing.

## 2021-06-15 NOTE — TELEPHONE ENCOUNTER
Chief Complaint   Patient presents with    Appointment     PSYCHIATRY     Spoke with Adriano Bell who informed me that the patient has to call into the office to schedule the appointment as a new patient. Left message for patient to return my call to discuss psychiatric appointment requirements. Need to instruct the patient to call [facility] at [phone]. Pt left message. States that you had sent him in some rxs today. States that he needs to know which one to use and when?

## 2022-02-22 DIAGNOSIS — B00.1 FEVER BLISTER: ICD-10-CM

## 2022-02-22 RX ORDER — VALACYCLOVIR HYDROCHLORIDE 500 MG/1
500 TABLET, FILM COATED ORAL DAILY
Qty: 90 TABLET | Refills: 3 | Status: SHIPPED | OUTPATIENT
Start: 2022-02-22

## 2022-03-19 PROBLEM — H93.13 TINNITUS OF BOTH EARS: Status: ACTIVE | Noted: 2020-10-01

## 2022-03-19 PROBLEM — R30.0 DYSURIA: Status: ACTIVE | Noted: 2020-12-17

## 2022-03-19 PROBLEM — I86.1 VARICOCELE: Status: ACTIVE | Noted: 2020-12-17

## 2022-03-19 PROBLEM — R19.5 LOOSE STOOLS: Status: ACTIVE | Noted: 2020-12-17

## 2022-04-12 ENCOUNTER — OFFICE VISIT (OUTPATIENT)
Dept: FAMILY MEDICINE CLINIC | Age: 57
End: 2022-04-12
Payer: COMMERCIAL

## 2022-04-12 VITALS
BODY MASS INDEX: 27.36 KG/M2 | SYSTOLIC BLOOD PRESSURE: 124 MMHG | HEIGHT: 74 IN | TEMPERATURE: 97.7 F | HEART RATE: 74 BPM | RESPIRATION RATE: 20 BRPM | OXYGEN SATURATION: 98 % | DIASTOLIC BLOOD PRESSURE: 76 MMHG | WEIGHT: 213.2 LBS

## 2022-04-12 DIAGNOSIS — Z00.00 ANNUAL PHYSICAL EXAM: Primary | ICD-10-CM

## 2022-04-12 PROCEDURE — 99396 PREV VISIT EST AGE 40-64: CPT | Performed by: NURSE PRACTITIONER

## 2022-04-12 NOTE — PROGRESS NOTES
Chief Complaint   Patient presents with    Physical     Visit Vitals  /76 (BP 1 Location: Left upper arm, BP Patient Position: Sitting, BP Cuff Size: Adult)   Pulse 74   Temp 97.7 °F (36.5 °C) (Temporal)   Resp 20   Ht 6' 2\" (1.88 m)   Wt 213 lb 3.2 oz (96.7 kg)   SpO2 98%   BMI 27.37 kg/m²       1. \"Have you been to the ER, urgent care clinic since your last visit? Hospitalized since your last visit? \" No    2. \"Have you seen or consulted any other health care providers outside of the [address] [place] [address] since your last visit? \" No     3. For patients aged 39-70: Has the patient had a colonoscopy / FIT/ Cologuard? YES    If the patient is female:    4. For patients aged 41-77: Has the patient had a mammogram within the past 2 years? NA - based on age or sex      11. For patients aged 21-65: Has the patient had a pap smear?  NA - based on age or sex

## 2022-04-12 NOTE — PROGRESS NOTES
Subjective:   Adriel Cordoba is a 59 y.o. male presenting for his annual checkup. ROS:  Feeling well. No dyspnea or chest pain on exertion. No abdominal pain, change in bowel habits, black or bloody stools. No urinary tract or prostatic symptoms. No neurological complaints. Specific concerns today: Overall feels well with no acute concerns. Patient Active Problem List   Diagnosis Code    Tinnitus of both ears H93.13    Varicocele I86.1    Loose stools R19.5    Dysuria R30.0     Patient Active Problem List    Diagnosis Date Noted    Varicocele 12/17/2020    Loose stools 12/17/2020    Dysuria 12/17/2020    Tinnitus of both ears 10/01/2020     Current Outpatient Medications   Medication Sig Dispense Refill    valACYclovir (Valtrex) 500 mg tablet Take 1 Tablet by mouth daily. 90 Tablet 3     No Known Allergies  History reviewed. No pertinent past medical history.   Past Surgical History:   Procedure Laterality Date    HX COLONOSCOPY  2018 2018 repeat in 5 years family history of polyps    HX ORTHOPAEDIC      left ankle surgery      Family History   Problem Relation Age of Onset    Hypertension Mother     Heart Disease Father     Hearing Impairment Father     Kidney Disease Father      Social History     Tobacco Use    Smoking status: Never Smoker    Smokeless tobacco: Former User     Types: Snuff   Substance Use Topics    Alcohol use: Not Currently        Lab Results   Component Value Date/Time    WBC 5.5 04/13/2022 08:31 AM    HGB 16.3 04/13/2022 08:31 AM    HCT 48.1 04/13/2022 08:31 AM    PLATELET 490 95/51/7247 08:31 AM    MCV 90 04/13/2022 08:31 AM     Lab Results   Component Value Date/Time    Glucose 93 04/13/2022 08:31 AM    Microalb/Creat ratio (ug/mg creat.) <5 04/13/2022 08:31 AM    LDL, calculated 97 04/13/2022 08:31 AM    Creatinine 1.10 04/13/2022 08:31 AM      Lab Results   Component Value Date/Time    Cholesterol, total 172 04/13/2022 08:31 AM    HDL Cholesterol 65 04/13/2022 08:31 AM    LDL, calculated 97 04/13/2022 08:31 AM    Triglyceride 47 04/13/2022 08:31 AM        Objective:     Visit Vitals  /76 (BP 1 Location: Left upper arm, BP Patient Position: Sitting, BP Cuff Size: Adult)   Pulse 74   Temp 97.7 °F (36.5 °C) (Temporal)   Resp 20   Ht 6' 2\" (1.88 m)   Wt 213 lb 3.2 oz (96.7 kg)   SpO2 98%   BMI 27.37 kg/m²     Chaperone present during visit and examination. The patient appears well, alert, oriented x 3, in no distress. ENT normal.  Neck supple. No adenopathy or thyromegaly. BILL. Lungs are clear, good air entry, no wheezes, rhonchi or rales. S1 and S2 normal, no murmurs, regular rate and rhythm. Abdomen is soft without tenderness, guarding, mass or organomegaly.  exam: no penile lesions or discharge, no testicular masses or tenderness, no hernias. Extremities show no edema, normal peripheral pulses. Neurological is normal without focal findings. Assessment/Plan:   healthy adult male  Issues discussed include maintaining a diet, weight control and exercise, continued home blood pressure monitoring, maintaining medication compliance, medication side effects, long term hypertension complications, and refraining from tobacco use. Treatment plan: Continue current medications, no changes, blood pressure goal discussed, labwork from today discussed and reviewed, future labwork ordered, A1C, lipids check, patient up to date on maintence and screening exams. lose weight, increase physical activity, follow low fat diet, follow low salt diet, continue present plan, routine labs ordered, have labs drawn prior to ROV, call if any problems. ICD-10-CM ICD-9-CM    1.  Annual physical exam  Z00.00 V70.0 CBC WITH AUTOMATED DIFF      METABOLIC PANEL, COMPREHENSIVE      LIPID PANEL      THYROID CASCADE PROFILE      MICROALBUMIN, UR, RAND W/ MICROALB/CREAT RATIO      TESTOSTERONE, FREE & TOTAL      PSA W/ REFLX FREE PSA      URINALYSIS W/ RFLX MICROSCOPIC     Follow-up and Dispositions    · Return in about 1 year (around 4/12/2023) for Annual wellness. Agus Maguire

## 2022-04-16 LAB
ALBUMIN SERPL-MCNC: 4.3 G/DL (ref 3.8–4.9)
ALBUMIN/CREAT UR: <5 MG/G CREAT (ref 0–29)
ALBUMIN/GLOB SERPL: 1.9 {RATIO} (ref 1.2–2.2)
ALP SERPL-CCNC: 61 IU/L (ref 44–121)
ALT SERPL-CCNC: 23 IU/L (ref 0–44)
APPEARANCE UR: CLEAR
AST SERPL-CCNC: 26 IU/L (ref 0–40)
BASOPHILS # BLD AUTO: 0 X10E3/UL (ref 0–0.2)
BASOPHILS NFR BLD AUTO: 1 %
BILIRUB SERPL-MCNC: 0.8 MG/DL (ref 0–1.2)
BILIRUB UR QL STRIP: NEGATIVE
BUN SERPL-MCNC: 12 MG/DL (ref 6–24)
BUN/CREAT SERPL: 11 (ref 9–20)
CALCIUM SERPL-MCNC: 9.4 MG/DL (ref 8.7–10.2)
CHLORIDE SERPL-SCNC: 102 MMOL/L (ref 96–106)
CHOLEST SERPL-MCNC: 172 MG/DL (ref 100–199)
CO2 SERPL-SCNC: 21 MMOL/L (ref 20–29)
COLOR UR: YELLOW
CREAT SERPL-MCNC: 1.1 MG/DL (ref 0.76–1.27)
CREAT UR-MCNC: 61 MG/DL
EGFR: 78 ML/MIN/1.73
EOSINOPHIL # BLD AUTO: 0.2 X10E3/UL (ref 0–0.4)
EOSINOPHIL NFR BLD AUTO: 4 %
ERYTHROCYTE [DISTWIDTH] IN BLOOD BY AUTOMATED COUNT: 13.1 % (ref 11.6–15.4)
GLOBULIN SER CALC-MCNC: 2.3 G/DL (ref 1.5–4.5)
GLUCOSE SERPL-MCNC: 93 MG/DL (ref 65–99)
GLUCOSE UR QL STRIP: NEGATIVE
HCT VFR BLD AUTO: 48.1 % (ref 37.5–51)
HDLC SERPL-MCNC: 65 MG/DL
HGB BLD-MCNC: 16.3 G/DL (ref 13–17.7)
HGB UR QL STRIP: NEGATIVE
IMM GRANULOCYTES # BLD AUTO: 0 X10E3/UL (ref 0–0.1)
IMM GRANULOCYTES NFR BLD AUTO: 0 %
KETONES UR QL STRIP: NEGATIVE
LDLC SERPL CALC-MCNC: 97 MG/DL (ref 0–99)
LEUKOCYTE ESTERASE UR QL STRIP: NEGATIVE
LYMPHOCYTES # BLD AUTO: 1.5 X10E3/UL (ref 0.7–3.1)
LYMPHOCYTES NFR BLD AUTO: 26 %
MCH RBC QN AUTO: 30.5 PG (ref 26.6–33)
MCHC RBC AUTO-ENTMCNC: 33.9 G/DL (ref 31.5–35.7)
MCV RBC AUTO: 90 FL (ref 79–97)
MICRO URNS: NORMAL
MICROALBUMIN UR-MCNC: <3 UG/ML
MONOCYTES # BLD AUTO: 0.6 X10E3/UL (ref 0.1–0.9)
MONOCYTES NFR BLD AUTO: 11 %
NEUTROPHILS # BLD AUTO: 3.1 X10E3/UL (ref 1.4–7)
NEUTROPHILS NFR BLD AUTO: 58 %
NITRITE UR QL STRIP: NEGATIVE
PH UR STRIP: 6 [PH] (ref 5–7.5)
PLATELET # BLD AUTO: 268 X10E3/UL (ref 150–450)
POTASSIUM SERPL-SCNC: 4.4 MMOL/L (ref 3.5–5.2)
PROT SERPL-MCNC: 6.6 G/DL (ref 6–8.5)
PROT UR QL STRIP: NEGATIVE
PSA SERPL-MCNC: 0.9 NG/ML (ref 0–4)
RBC # BLD AUTO: 5.34 X10E6/UL (ref 4.14–5.8)
REFLEX CRITERIA: NORMAL
SODIUM SERPL-SCNC: 139 MMOL/L (ref 134–144)
SP GR UR STRIP: 1.01 (ref 1–1.03)
TESTOST FREE SERPL-MCNC: 11.4 PG/ML (ref 7.2–24)
TESTOST SERPL-MCNC: 613 NG/DL (ref 264–916)
TRIGL SERPL-MCNC: 47 MG/DL (ref 0–149)
TSH SERPL DL<=0.005 MIU/L-ACNC: 2.24 UIU/ML (ref 0.45–4.5)
UROBILINOGEN UR STRIP-MCNC: 0.2 MG/DL (ref 0.2–1)
VLDLC SERPL CALC-MCNC: 10 MG/DL (ref 5–40)
WBC # BLD AUTO: 5.5 X10E3/UL (ref 3.4–10.8)

## 2023-05-19 RX ORDER — VALACYCLOVIR HYDROCHLORIDE 500 MG/1
500 TABLET, FILM COATED ORAL DAILY
COMMUNITY
Start: 2022-02-22

## 2024-02-09 ENCOUNTER — TELEPHONE (OUTPATIENT)
Facility: CLINIC | Age: 59
End: 2024-02-09

## 2024-02-09 NOTE — TELEPHONE ENCOUNTER
Prior pt of Dr. Zavala called asking for the date of his last Tetanus shot.  Please call patient at [phone].  Practice manager did state we are allowed to do this since he was a prior patient. Thank you

## 2024-02-09 NOTE — TELEPHONE ENCOUNTER
Spoke to patient informed patient of last TD 11/12/2020  Recommended he receive a booster due to megan nail injury